# Patient Record
Sex: FEMALE | Race: WHITE | NOT HISPANIC OR LATINO | Employment: UNEMPLOYED | URBAN - METROPOLITAN AREA
[De-identification: names, ages, dates, MRNs, and addresses within clinical notes are randomized per-mention and may not be internally consistent; named-entity substitution may affect disease eponyms.]

---

## 2021-11-16 ENCOUNTER — TELEPHONE (OUTPATIENT)
Dept: NEUROSURGERY | Facility: CLINIC | Age: 61
End: 2021-11-16

## 2021-11-16 NOTE — TELEPHONE ENCOUNTER
New Patient  Referring to Dr Khan  Referring Provider: patients sister     PCP: Dr Madden       Most Recent Studies:    MRI: C-Spine at [facility] (10/18/2021) patient has disc    Xrays: C-Spine at [facility] (10/04/2021) patient has disc     Dexa: No    EMG: Yes at Dr Valadez's office (records scanned)       Onset of Symptoms & Reason for Visit:  Patient states that they have pain and numbness that shoots down arms. They are also experiencing headaches. They have pain in the back of their head that goes down to their shoulders. Weakness in both arms ans hands. They have tingling that comes and goes in their fingers. [place] a post office years ago and they been in pain since. They tried physical therapy and had injection and it does not help the pain. Issue initially started after incident at post office.       Physical Therapy: Yes at Dr Valadez's office (records scanned)     Pain Management: Yes at the [facility] w/ Dr Betts and with Dr Magallanes at [facility] (sent records request)    Previous Surgery: Yes in 2017 at [facility] (does not remember the surgeon)     Endocrinology: patient does have a Dr but does not remember the name. Told patient to call me back with the info     Rheumatology: Yes Dr Alvarado at [facility] (records scanned)     Insurance: [employer]    W/C or Auto: No

## 2021-11-17 DIAGNOSIS — M54.12 CERVICAL RADICULAR PAIN: Primary | ICD-10-CM

## 2021-11-18 NOTE — TELEPHONE ENCOUNTER
Can see per AJ, xrays ordered    Spoke to patient, made appointment for 12/8/21 with Dr Khan. Patient has xrays scheduled for 11/29/21. Advised to bring all radiology discs. Emailed paperwork

## 2021-11-29 ENCOUNTER — HOSPITAL ENCOUNTER (OUTPATIENT)
Dept: RADIOLOGY | Facility: CLINIC | Age: 61
Discharge: HOME | End: 2021-11-29
Attending: NEUROLOGICAL SURGERY
Payer: COMMERCIAL

## 2021-11-29 DIAGNOSIS — M54.12 CERVICAL RADICULAR PAIN: ICD-10-CM

## 2021-11-29 PROCEDURE — 72040 X-RAY EXAM NECK SPINE 2-3 VW: CPT

## 2021-12-08 ENCOUNTER — CONSULT (OUTPATIENT)
Dept: NEUROSURGERY | Facility: CLINIC | Age: 61
End: 2021-12-08
Payer: COMMERCIAL

## 2021-12-08 VITALS
TEMPERATURE: 96.6 F | HEIGHT: 70 IN | SYSTOLIC BLOOD PRESSURE: 140 MMHG | OXYGEN SATURATION: 99 % | HEART RATE: 102 BPM | WEIGHT: 200 LBS | BODY MASS INDEX: 28.63 KG/M2 | DIASTOLIC BLOOD PRESSURE: 100 MMHG | RESPIRATION RATE: 20 BRPM

## 2021-12-08 DIAGNOSIS — M54.81 BILATERAL OCCIPITAL NEURALGIA: Primary | ICD-10-CM

## 2021-12-08 PROCEDURE — 3008F BODY MASS INDEX DOCD: CPT | Performed by: NEUROLOGICAL SURGERY

## 2021-12-08 PROCEDURE — 99205 OFFICE O/P NEW HI 60 MIN: CPT | Performed by: NEUROLOGICAL SURGERY

## 2021-12-08 RX ORDER — LEVOTHYROXINE SODIUM 25 UG/1
TABLET ORAL
COMMUNITY
Start: 2021-09-07

## 2021-12-08 RX ORDER — FAMOTIDINE 40 MG/1
TABLET, FILM COATED ORAL
COMMUNITY
Start: 2021-10-14

## 2021-12-08 RX ORDER — HYDROXYCHLOROQUINE SULFATE 200 MG/1
TABLET, FILM COATED ORAL
COMMUNITY
Start: 2021-11-27

## 2021-12-08 RX ORDER — TALC
POWDER (GRAM) TOPICAL SEE ADMIN INSTRUCTIONS
COMMUNITY

## 2021-12-08 RX ORDER — LEVOTHYROXINE SODIUM 50 UG/1
TABLET ORAL DAILY
COMMUNITY

## 2021-12-08 RX ORDER — CYCLOSPORINE 0.5 MG/ML
EMULSION OPHTHALMIC
COMMUNITY

## 2021-12-08 RX ORDER — UBIDECARENONE 100 MG
CAPSULE ORAL SEE ADMIN INSTRUCTIONS
COMMUNITY

## 2021-12-08 RX ORDER — ALBUTEROL SULFATE 90 UG/1
INHALANT RESPIRATORY (INHALATION) EVERY 4 HOURS
COMMUNITY

## 2021-12-08 RX ORDER — EZETIMIBE 10 MG/1
TABLET ORAL
COMMUNITY
Start: 2021-11-17

## 2021-12-08 NOTE — PROGRESS NOTES
[facility]  Medical Office Building [address], [address]  [place] [address]  Phone: [phone]  Fax: [phone]      Patient ID: Staci Lowery                              : 1960    Visit Date: 2021    Referring Provider: Self-Referred    Chief Complaint:    She complains of neck and arm pain    History of Present Illness:   Staci Lowery is a pleasant 61 y.o. female seen today by the neurosurgery service as a consult for persistent neck pain since an ACDF in 2017 performed by a surgeon at [facility]. The initial ACDF was completed secondary to a large disc herniation resulting in myelopathy.The patient lives in [place] where she has been following with pain management for her chronic pain syndrome consisting of neck pain, arm pain and various joint pains. She recently saw a surgeon for her neck pain who recommended extending the ACDF however she is presenting today for a second opinion.     She describes a constant neck pain with intermittent waves of severe sharp pain radiating from the base of the skull to the eyes as well as a shooting pain from her neck to her 2nd-4th digits R>L. Moving her neck too much may trigger the shooting pain. She has undergone numerous injections in the cervical spine although she is unsure of the exact procedures or locations. She feels weak in both hands and arms. She [facility] which has helped some of her shooting pain. She continues with paresthesias in the hands described as cotton ball sensation.     She is scheduled to see a pain management physician in the next few weeks (Dr. Amor Tate). She spends some time in [place] because family is here but most of her care is in [place].     She denies any gait instability, bowel/bladder incontinence or vision changes.       Past Medical History:   Past Medical History:   Diagnosis Date   • Cervical disc  disorder    • Hypertension    • Hypothyroidism    • Lung nodule    • Neck pain    • Pulmonary embolism (CMS/HCC)        Medications:     Current Outpatient Medications:   •  albuterol HFA 90 mcg/actuation inhaler, every 4 hours., Disp: , Rfl:   •  apixaban 2.5 mg tablet, Take 2.5 mg by mouth 2 times daily., Disp: , Rfl:   •  coenzyme Q10 100 mg capsule, Take by mouth See admin instr., Disp: , Rfl:   •  cycloSPORINE (RESTASIS) 0.05 % ophthalmic emulsion, Restasis 0.05 % eye drops in a dropperette  INSTILL 1 DROP INTO AFFECTED EYE(S) BY OPHTHALMIC ROUTE EVERY 12 HOURS, Disp: , Rfl:   •  ezetimibe 10 mg tablet, , Disp: , Rfl:   •  famotidine 40 mg tablet, , Disp: , Rfl:   •  hydrOXYchloroQUINE 200 mg tablet, , Disp: , Rfl:   •  levothyroxine (SYNTHROID) 50 mcg tablet, daily., Disp: , Rfl:   •  medical marijuana, , Disp: , Rfl:   •  melatonin 3 mg tablet, Take by mouth See admin instr., Disp: , Rfl:   •  SYNTHROID 25 mcg tablet, , Disp: , Rfl:      Past Surgical History:   Past Surgical History:   Procedure Laterality Date   • ANTERIOR CERVICAL DISCECTOMY W/ FUSION     • APPENDECTOMY     • CARPAL AND CUBITAL/RADIAL TUNNEL RELEASES NERVE HAND+ELBOW     • CARPAL TUNNEL RELEASE     •  SECTION     • THYROIDECTOMY, PARTIAL     • ULNAR TUNNEL RELEASE         Allergies:  Allergies   Allergen Reactions   • Codeine Hives and Itching   • Morphine Anaphylaxis, Itching and Shortness of breath   • Metformin      Other reaction(s): Edema   • Diclofenac Sodium      Other reaction(s): Dizziness   • Duloxetine      Urinary frequency, headache, blurred vision     • Gabapentin      Other reaction(s): Dizziness   • Methotrexate    • Oxycodone-Acetaminophen Nausea Only and Rash     Patient tolerates tylenol and vicodin         Family History:  Family History   Problem Relation Age of Onset   • Hypertension Biological Mother    • Diabetes Biological Mother    • Heart attack Biological Father        Social History:  Social History      Socioeconomic History   • Marital status:      Spouse name: Not on file   • Number of children: Not on file   • Years of education: Not on file   • Highest education level: Not on file   Occupational History   • Not on file   Tobacco Use   • Smoking status: Former Smoker     Quit date:      Years since quittin.9   • Smokeless tobacco: Never Used   • Tobacco comment: casual smoker   Substance and Sexual Activity   • Alcohol use: Yes     Comment: socially drinks   • Drug use: Yes     Types: Marijuana     Comment: prescribed   • Sexual activity: Defer   Other Topics Concern   • Not on file   Social History Narrative   • Not on file     Social Determinants of Health     Financial Resource Strain: Not on file   Food Insecurity: Not on file   Transportation Needs: Not on file   Physical Activity: Not on file   Stress: Not on file   Social Connections: Not on file   Intimate Partner Violence: Not on file   Housing Stability: Not on file       Vitals:   Vitals:    21 1106   BP: (!) 140/100   Pulse: (!) 102   Resp: 20   Temp: (!) 35.9 °C (96.6 °F)   SpO2: 99%       Review of Systems:  A complete 14 point review of systems was conducted and was deemed negative except what was documented in the HPI.    Physical Examination:    Constitutional: Appears well-developed and well-nourished.   Head: Normocephalic and atraumatic.   Right Ear: External ear normal.   Left Ear: External ear normal.   Nose: Nose normal.   Mouth/Throat: Oropharynx is clear and moist.   Eyes: Conjunctivae and EOM are normal. Pupils are equal, round, and reactive to light. No scleral icterus.   Neck: Normal range of motion. Neck supple.   Cardiovascular: Normal rate and regular rhythm.   Pulmonary/Chest: Effort normal   Musculoskeletal: Right arm splint.   Skin: Skin is warm and dry. No rash noted. No erythema.   Psychiatric: Normal mood and affect. Speech is normal and behavior is normal. Thought content normal.   Vitals  reviewed.      Neurological Examination:  Neurologic Exam     Mental Status   Oriented to person, place, and time.   Attention: normal.   Speech: speech is normal   Level of consciousness: alert    Cranial Nerves     CN II   Visual acuity: normal    CN III, IV, VI   Pupils are equal, round, and reactive to light.  Extraocular motions are normal.   Right pupil: Size: 3 mm. Shape: regular. Reactivity: brisk.   Left pupil: Size: 3 mm. Shape: regular. Reactivity: brisk.   CN III: no CN III palsy  CN VI: no CN VI palsy  Nystagmus: none     CN V   Facial sensation intact.     CN VII  Symmetric Facial movements    CN VIII  Hearing: intact    CN IX, X   CN IX normal.   Palate: symmetric    CN XI   Right sternocleidomastoid strength: normal  Left sternocleidomastoid strength: normal    CN XII   Tongue: not atrophic    Sensation:  Decreased sensation to light touch in the median and ulnar nerve distribution bilaterally    Motor:     Deltoid Biceps Triceps Wrist ext Finger ext Hand Intrinsics Hip flexion Knee ext Dorsi-  flexion EHL Plantar Flexion   R 5 5 5 5 5 4+ 5 5 5 5 5   L 5 5 5 5 5 4+ 5 5 5 5 5     There is no pronator drift. Muscle bulk and tone are normal.     Gait, Coordination, and Reflexes     Reflexes   Reflexes 2+ except as noted.   Right plantar: normal  Left plantar: normal  Right Cool: absent  Left Cool: absent  Right ankle clonus: absent  Left ankle clonus: absent    Data Review:    Lab Results:   No results found for: WBC, HGB, HCT, MCV, PLT, RDW   No results found for: GLUCOSE, GLUC, CREAT, BUN, NA, K, CL, CO2, ANIONGAP, CA     Imaging:   Independent review of all imaging was done by myself as well as review of the radiologists readings and comparison to prior films.      11/16/2019 UE EMG        Cervical MRI 10/18/2021        Assessment / Plan:     In summary, Staci Lowery is a pleasant 61 y.o. female with persistent neck pain since an ACDF in 2017 performed by a surgeon at [facility]. The  initial ACDF was completed secondary to a large disc herniation resulting in myelopathy manifesting as balance difficulties and hand weakness.The patient lives in [place] where she has been following with pain management for her chronic pain syndrome consisting of neck pain, arm pain and various joint pains. She recently saw a surgeon for her neck pain who recommended extending the ACDF however she is presenting today for a second opinion.     She describes a constant ache and tightness in the neck with intermittent waves of severe sharp pain radiating from the base of the skull to the eyes as well as a shooting pain from her neck to her 2nd-4th digits R>L. Moving her neck too much may trigger the shooting pain. She has undergone numerous injections in the cervical spine although she is unsure of the exact procedures or locations. She feels weak in both hands and arms. She [facility] which has helped some of her shooting pain into the hand. She continues with paresthesias in the hands described as cotton ball sensation involving multiple dermatomes.     She is scheduled to see a pain management physician in the next few weeks (Dr. Amor Tate). She spends some time in [place] because family is here but most of her care is in [place].     On exam, she has some trace weakness with hand  bilaterally but otherwise full strength without any pathologic reflexes.     Her cervical MRI does not show any severe foraminal narrowing or focal central stenosis. She appears fused at C4-C5 with intact surgical hardware at this level on the CT. There is no instability on her flexion/extension x-rays.     She complains of pain radiating from the base of the skull to the top of head consistent with occipital neuralgia . She is scheduled to see pain management in the next several days and we have recommended bilateral occipital nerve blocks. In addition, she has focal  tenderness in the trapezius bilaterally and we have recommended trigger point injections into the these areas.     We would not recommend surgical intervention. Her pain is likely multifactorial in nature and has been ongoing for several years. She has multiple peripheral neuropathies in the upper extremities as demonstrated on her last EMG which could account for some of her radicular pain. She also has focal pain in the soft tissue supporting a musculoskeletal component to her pain syndrome. I do not feel extending her cervical fusion would give her enough relief of her symptoms. Ms. Lowery will follow-up with us as needed. She will call with any additional questions or concerns.        I, Juno Rubio PA-C, am scribing for, and in the presence of, Dr. Noah Khan.      This document was created using dragon dictation software.  There might be some typographical errors due to this technology.       60 minutes were spent with the patient on examination, review of past medical history, and prior imaging, and coordinating care.  Patient seen earlier today. Signature timestamp does not reflect patient encounter time.   More than 50% of the time is spent counseling the patient and family and coordinating care.

## 2022-03-03 ENCOUNTER — CONSULTATION/EVALUATION (OUTPATIENT)
Dept: URBAN - METROPOLITAN AREA CLINIC 43 | Facility: CLINIC | Age: 62
End: 2022-03-03

## 2022-03-03 DIAGNOSIS — H52.13: ICD-10-CM

## 2022-03-03 DIAGNOSIS — H25.813: ICD-10-CM

## 2022-03-03 DIAGNOSIS — H40.013: ICD-10-CM

## 2022-03-03 DIAGNOSIS — H04.123: ICD-10-CM

## 2022-03-03 PROCEDURE — 92025CV CORNEAL TOPOGRAPHY, CV

## 2022-03-03 PROCEDURE — 92136TC50 INTERFEROMETRY - TECHNICAL COMPONENT

## 2022-03-03 PROCEDURE — 92134 CPTRZ OPH DX IMG PST SGM RTA: CPT

## 2022-03-03 PROCEDURE — 76514 ECHO EXAM OF EYE THICKNESS: CPT

## 2022-03-03 PROCEDURE — 92133 CPTRZD OPH DX IMG PST SGM ON: CPT

## 2022-03-03 PROCEDURE — 92202 OPSCPY EXTND ON/MAC DRAW: CPT

## 2022-03-03 PROCEDURE — 92020 GONIOSCOPY: CPT

## 2022-03-03 PROCEDURE — 99204 OFFICE O/P NEW MOD 45 MIN: CPT

## 2022-03-03 RX ORDER — MOXIFLOXACIN OPHTHALMIC 5 MG/ML: 1 SOLUTION/ DROPS OPHTHALMIC

## 2022-03-03 RX ORDER — KETOROLAC TROMETHAMINE 5 MG/ML: 1 SOLUTION OPHTHALMIC

## 2022-03-03 RX ORDER — PREDNISOLONE ACETATE 10 MG/ML: 1 SUSPENSION/ DROPS OPHTHALMIC

## 2022-03-03 ASSESSMENT — VISUAL ACUITY
OS_BAT: <20/400
OD_SC: 20/60
OD_BAT: 20/200
OD_SC: J6
OS_CC: 20/60-1
OD_CC: J2
OS_PH: 20/30-2
OS_CC: J1
OD_CC: 20/40-2
OD_PH: 20/25-1
OS_SC: 20/100
OS_SC: J2

## 2022-03-03 ASSESSMENT — PACHYMETRY
OS_CT_UM: 606
OD_CT_UM: 604

## 2022-03-03 ASSESSMENT — TONOMETRY
OD_IOP_MMHG: 20
OS_IOP_MMHG: 19

## 2022-03-03 NOTE — PATIENT DISCUSSION
PLAN; CE/IOL OD THEN OS, goal shaka ou, p.o w/O.D in [place], no van needed. Candidate for all lens options. Wants BASIC PLUS ou, understands she will most likely need glasses for all ranges. Discussed each lens options in great detail. All questions answered. No Imprimis ou.

## 2022-03-21 ENCOUNTER — SURGERY/PROCEDURE (OUTPATIENT)
Dept: URBAN - METROPOLITAN AREA CLINIC 43 | Facility: CLINIC | Age: 62
End: 2022-03-21

## 2022-03-21 ENCOUNTER — PRE-OP/H&P (OUTPATIENT)
Dept: URBAN - METROPOLITAN AREA CLINIC 39 | Facility: CLINIC | Age: 62
End: 2022-03-21

## 2022-03-21 DIAGNOSIS — H25.813: ICD-10-CM

## 2022-03-21 DIAGNOSIS — H40.013: ICD-10-CM

## 2022-03-21 DIAGNOSIS — H25.811: ICD-10-CM

## 2022-03-21 PROCEDURE — 99211HP H&P OFFICE/OUTPATIENT VISIT, EST

## 2022-03-21 PROCEDURE — 66984CV REMOVE CATARACT, INSERT LENS, CUSTOM VISION

## 2022-03-21 PROCEDURE — 66999LNSR LENSAR LASER FOR CAT SX

## 2022-03-22 ENCOUNTER — POST-OP (OUTPATIENT)
Dept: URBAN - METROPOLITAN AREA CLINIC 36 | Facility: CLINIC | Age: 62
End: 2022-03-22

## 2022-03-22 DIAGNOSIS — Z96.1: ICD-10-CM

## 2022-03-22 PROCEDURE — 99024 POSTOP FOLLOW-UP VISIT: CPT

## 2022-03-22 ASSESSMENT — VISUAL ACUITY
OD_SC: J5
OS_SC: 20/80
OS_PH: 20/30
OS_SC: J6
OD_SC: 20/25

## 2022-03-22 ASSESSMENT — TONOMETRY
OD_IOP_MMHG: 22
OS_IOP_MMHG: 19

## 2022-03-22 NOTE — PATIENT DISCUSSION
**3/15/22 PER 59 Saimr Flannery, PT NOW ELECTS MOISÉS Malagon**.
**3/15/22 PER 59 Samir Flannery, PT ELECTS CV BILL DOMINGUEZ. Manas**.
1 day PO: Patient is doing well post-operatively. The importance of post-op drop compliance was emphasized. Drop schedule reviewed with patient. Patient to call if any visual changes or concerns.
CATARACT SURGERY PLANNED FOR TODAY.  OD.
Discussed condition and exacerbating conditions/situations (e.g., dry/arid environments, overhead fans, air conditioners, side effect of medications).
Discussed lid hygiene, warm compress and eyelid wash.
Instructed to call immediately if any new distortion, blurring, decreased vision or eye pain.
Monitor.
OCT BASELINE OU.
PLAN; CE/IOL OD THEN OS, goal shaka ou, p.o w/O.D in [place], no van needed. Candidate for all lens options. Wants BASIC PLUS ou, understands she will most likely need glasses for all ranges. Discussed each lens options in great detail. All questions answered. No Imprimis ou.
Patient advised to call if any problems, questions, or concerns.
Patient made aware of 24/7 emergency services.
Patient understands condition, prognosis and need for follow up care.
Post op gtt instructions reviewed with patient.
Recommended artificial tears to use: 1 drop 4x a day in both eyes.
Recommended observation.
The patient feels that the cataract is significantly impacting daily activities and has elected cataract surgery. The risks, benefits, and alternatives to surgery were discussed. The patient elects to proceed with surgery.
The patient has always been near-sighted, but they desire to change this when they have their cataract surgery so that they will have good distance vision. The patient was advised that there will be a necessary period of adaptation to this new vision and they will miss their unaided reading vision. The patient understands and chooses good uncorrected distance vision with the possibility of a refractive surprise.
The types of intraocular lenses were reviewed with the patient along with a discussion of their various strengths and weaknesses.
0

## 2022-03-28 ENCOUNTER — SURGERY/PROCEDURE (OUTPATIENT)
Dept: URBAN - METROPOLITAN AREA CLINIC 43 | Facility: CLINIC | Age: 62
End: 2022-03-28

## 2022-03-28 ENCOUNTER — POST-OP (OUTPATIENT)
Dept: URBAN - METROPOLITAN AREA CLINIC 39 | Facility: CLINIC | Age: 62
End: 2022-03-28

## 2022-03-28 DIAGNOSIS — H25.812: ICD-10-CM

## 2022-03-28 DIAGNOSIS — Z96.1: ICD-10-CM

## 2022-03-28 PROCEDURE — 66999LNSR LENSAR LASER FOR CAT SX

## 2022-03-28 PROCEDURE — 66984AV REMOVE CATARACT, INSERT ADVANCED LENS

## 2022-03-28 PROCEDURE — P6698455 NON-COMANAGED ADVANCED PO

## 2022-03-28 ASSESSMENT — TONOMETRY: OD_IOP_MMHG: 13

## 2022-03-28 ASSESSMENT — VISUAL ACUITY: OD_SC: 20/25-1

## 2022-03-28 NOTE — PATIENT DISCUSSION
The patient feels that the cataract is significantly impacting daily activities and has elected cataract surgery. The risks, benefits, and alternatives to surgery were discussed. The patient elects to proceed with surgery. 0

## 2022-03-29 ENCOUNTER — POST-OP (OUTPATIENT)
Dept: URBAN - METROPOLITAN AREA CLINIC 36 | Facility: CLINIC | Age: 62
End: 2022-03-29

## 2022-03-29 DIAGNOSIS — Z96.1: ICD-10-CM

## 2022-03-29 PROCEDURE — 99024 POSTOP FOLLOW-UP VISIT: CPT

## 2022-03-29 ASSESSMENT — VISUAL ACUITY
OD_SC: J12
OU_SC: J12
OD_SC: 20/30-2
OS_SC: 20/20-2
OS_SC: J12
OU_SC: 20/20-1

## 2022-03-29 ASSESSMENT — TONOMETRY
OD_IOP_MMHG: 15
OS_IOP_MMHG: 14

## 2022-03-30 ENCOUNTER — POST-OP (OUTPATIENT)
Dept: URBAN - METROPOLITAN AREA CLINIC 36 | Facility: CLINIC | Age: 62
End: 2022-03-30

## 2022-03-30 DIAGNOSIS — Z96.1: ICD-10-CM

## 2022-03-30 PROCEDURE — 99024 POSTOP FOLLOW-UP VISIT: CPT

## 2022-03-30 ASSESSMENT — VISUAL ACUITY
OD_SC: 20/30
OS_SC: 20/20-1

## 2022-04-06 ENCOUNTER — POST-OP (OUTPATIENT)
Dept: URBAN - METROPOLITAN AREA CLINIC 36 | Facility: CLINIC | Age: 62
End: 2022-04-06

## 2022-04-06 DIAGNOSIS — Z96.1: ICD-10-CM

## 2022-04-06 PROCEDURE — 99024 POSTOP FOLLOW-UP VISIT: CPT

## 2022-04-06 ASSESSMENT — VISUAL ACUITY
OD_SC: 20/40+2
OS_SC: J4
OU_SC: 20/30
OD_PH: 20/20-2
OS_SC: 20/30-2
OU_SC: J4
OS_PH: 20/25
OD_SC: J6

## 2022-04-06 ASSESSMENT — TONOMETRY
OD_IOP_MMHG: 16
OS_IOP_MMHG: 16

## 2022-04-28 ENCOUNTER — POST-OP (OUTPATIENT)
Dept: URBAN - METROPOLITAN AREA CLINIC 36 | Facility: CLINIC | Age: 62
End: 2022-04-28

## 2022-04-28 ENCOUNTER — FOLLOW UP (OUTPATIENT)
Dept: URBAN - METROPOLITAN AREA CLINIC 36 | Facility: CLINIC | Age: 62
End: 2022-04-28

## 2022-04-28 DIAGNOSIS — H53.141: ICD-10-CM

## 2022-04-28 DIAGNOSIS — Z96.1: ICD-10-CM

## 2022-04-28 PROCEDURE — 99024 POSTOP FOLLOW-UP VISIT: CPT

## 2022-04-28 PROCEDURE — 92083 EXTENDED VISUAL FIELD XM: CPT

## 2022-04-28 PROCEDURE — 92012 INTRM OPH EXAM EST PATIENT: CPT

## 2022-04-28 ASSESSMENT — VISUAL ACUITY
OU_SC: 20/20
OS_SC: 20/30-1
OU_SC: J1
OS_SC: J3
OD_SC: 20/25
OD_SC: J5

## 2022-04-28 ASSESSMENT — TONOMETRY
OD_IOP_MMHG: 17
OS_IOP_MMHG: 18

## 2022-06-20 ENCOUNTER — EMERGENCY VISIT (OUTPATIENT)
Dept: URBAN - METROPOLITAN AREA CLINIC 47 | Facility: CLINIC | Age: 62
End: 2022-06-20

## 2022-06-20 DIAGNOSIS — H26.493: ICD-10-CM

## 2022-06-20 DIAGNOSIS — H43.811: ICD-10-CM

## 2022-06-20 PROCEDURE — 99214 OFFICE O/P EST MOD 30 MIN: CPT

## 2022-06-20 ASSESSMENT — VISUAL ACUITY
OS_SC: 20/30
OD_SC: 20/20

## 2022-06-24 ENCOUNTER — ESTABLISHED PATIENT (OUTPATIENT)
Dept: URBAN - METROPOLITAN AREA CLINIC 36 | Facility: CLINIC | Age: 62
End: 2022-06-24

## 2022-06-24 DIAGNOSIS — H43.811: ICD-10-CM

## 2022-06-24 PROCEDURE — 92014 COMPRE OPH EXAM EST PT 1/>: CPT

## 2022-06-24 ASSESSMENT — VISUAL ACUITY
OS_SC: 20/20
OD_SC: 20/25-1

## 2022-06-24 ASSESSMENT — TONOMETRY
OD_IOP_MMHG: 14
OS_IOP_MMHG: 17

## 2022-07-07 ENCOUNTER — ESTABLISHED PATIENT (OUTPATIENT)
Dept: URBAN - METROPOLITAN AREA CLINIC 36 | Facility: CLINIC | Age: 62
End: 2022-07-07

## 2022-07-07 DIAGNOSIS — H43.811: ICD-10-CM

## 2022-07-07 PROCEDURE — 92014 COMPRE OPH EXAM EST PT 1/>: CPT

## 2022-07-07 ASSESSMENT — VISUAL ACUITY
OS_SC: 20/30
OD_SC: 20/30

## 2022-07-07 ASSESSMENT — TONOMETRY
OS_IOP_MMHG: 17
OD_IOP_MMHG: 15

## 2022-08-04 ENCOUNTER — CONSULTATION/EVALUATION (OUTPATIENT)
Dept: URBAN - METROPOLITAN AREA CLINIC 43 | Facility: CLINIC | Age: 62
End: 2022-08-04

## 2022-08-04 DIAGNOSIS — H26.493: ICD-10-CM

## 2022-08-04 PROCEDURE — 92004 COMPRE OPH EXAM NEW PT 1/>: CPT

## 2022-08-04 ASSESSMENT — VISUAL ACUITY
OD_PH: 20/30-1
OD_BAT: 20/400
OS_SC: 20/30-1
OS_SC: J6
OD_SC: J12
OS_BAT: 20/400

## 2022-08-04 ASSESSMENT — TONOMETRY
OS_IOP_MMHG: 12
OD_IOP_MMHG: 13

## 2022-08-29 ENCOUNTER — SURGERY/PROCEDURE (OUTPATIENT)
Dept: URBAN - METROPOLITAN AREA SURGERY 14 | Facility: SURGERY | Age: 62
End: 2022-08-29

## 2022-08-29 DIAGNOSIS — H26.493: ICD-10-CM

## 2022-08-29 PROCEDURE — 6682150 YAG CAPSULOTOMY

## 2022-09-08 ENCOUNTER — POST-OP (OUTPATIENT)
Dept: URBAN - METROPOLITAN AREA CLINIC 36 | Facility: CLINIC | Age: 62
End: 2022-09-08

## 2022-09-08 DIAGNOSIS — Z98.890: ICD-10-CM

## 2022-09-08 ASSESSMENT — VISUAL ACUITY
OS_SC: 20/25
OU_SC: J4
OS_SC: J2
OD_SC: 20/25
OU_SC: 20/25
OD_SC: J12

## 2022-09-08 ASSESSMENT — TONOMETRY
OS_IOP_MMHG: 15
OD_IOP_MMHG: 15

## 2022-12-22 NOTE — PATIENT DISCUSSION
Recommend Mini lower lift, pre/post-tragel (pre-diabetic, diet controlled), SMAS to cheeks(discussed risks and benefits of sx. .. ).

## 2022-12-22 NOTE — PATIENT DISCUSSION
Recommend 2cc's of [employer] ultra (discussed risks and benefits. .. ).

## 2022-12-28 NOTE — PATIENT DISCUSSION
This visual field clearly demonstrated a minimum of 39% loss of upper field of vision OU, with upper lid skin in repose and elevated by taping of the lid to demonstrate potential correction. This field shows that taping the lids significantly improved this patient's superior field of vision by approximately 39%, OU.

## 2023-11-22 ENCOUNTER — EMERGENCY VISIT (OUTPATIENT)
Dept: URBAN - METROPOLITAN AREA CLINIC 43 | Facility: CLINIC | Age: 63
End: 2023-11-22

## 2023-11-22 DIAGNOSIS — H40.013: ICD-10-CM

## 2023-11-22 DIAGNOSIS — H04.123: ICD-10-CM

## 2023-11-22 DIAGNOSIS — H43.813: ICD-10-CM

## 2023-11-22 PROCEDURE — 92250 FUNDUS PHOTOGRAPHY W/I&R: CPT

## 2023-11-22 PROCEDURE — 92012 INTRM OPH EXAM EST PATIENT: CPT

## 2023-11-22 ASSESSMENT — TONOMETRY
OD_IOP_MMHG: 18
OS_IOP_MMHG: 18

## 2023-11-22 ASSESSMENT — VISUAL ACUITY
OS_SC: 20/25
OD_SC: 20/25-2